# Patient Record
Sex: MALE | Race: WHITE
[De-identification: names, ages, dates, MRNs, and addresses within clinical notes are randomized per-mention and may not be internally consistent; named-entity substitution may affect disease eponyms.]

---

## 2017-01-17 ENCOUNTER — HOSPITAL ENCOUNTER (OUTPATIENT)
Dept: HOSPITAL 84 - D.CATH | Age: 82
LOS: 1 days | Discharge: HOME | End: 2017-01-18
Attending: INTERNAL MEDICINE
Payer: MEDICARE

## 2017-01-17 VITALS — SYSTOLIC BLOOD PRESSURE: 207 MMHG | DIASTOLIC BLOOD PRESSURE: 96 MMHG

## 2017-01-17 VITALS
WEIGHT: 278.58 LBS | BODY MASS INDEX: 35.75 KG/M2 | HEIGHT: 74 IN | WEIGHT: 278.58 LBS | HEIGHT: 74 IN | BODY MASS INDEX: 35.75 KG/M2 | BODY MASS INDEX: 35.75 KG/M2

## 2017-01-17 VITALS — SYSTOLIC BLOOD PRESSURE: 172 MMHG | DIASTOLIC BLOOD PRESSURE: 94 MMHG

## 2017-01-17 VITALS — SYSTOLIC BLOOD PRESSURE: 207 MMHG | DIASTOLIC BLOOD PRESSURE: 107 MMHG

## 2017-01-17 VITALS — SYSTOLIC BLOOD PRESSURE: 127 MMHG | DIASTOLIC BLOOD PRESSURE: 64 MMHG

## 2017-01-17 DIAGNOSIS — I50.22: ICD-10-CM

## 2017-01-17 DIAGNOSIS — I42.9: ICD-10-CM

## 2017-01-17 DIAGNOSIS — I25.119: Primary | ICD-10-CM

## 2017-01-17 DIAGNOSIS — Z95.5: ICD-10-CM

## 2017-01-17 LAB
ANION GAP SERPL CALC-SCNC: 10.2 MMOL/L (ref 8–16)
BASOPHILS NFR BLD AUTO: 0.6 % (ref 0–2)
BUN SERPL-MCNC: 10 MG/DL (ref 7–18)
CALCIUM SERPL-MCNC: 8.6 MG/DL (ref 8.5–10.1)
CHLORIDE SERPL-SCNC: 99 MMOL/L (ref 98–107)
CO2 SERPL-SCNC: 33.4 MMOL/L (ref 21–32)
CREAT SERPL-MCNC: 0.8 MG/DL (ref 0.6–1.3)
EOSINOPHIL NFR BLD: 3.7 % (ref 0–7)
ERYTHROCYTE [DISTWIDTH] IN BLOOD BY AUTOMATED COUNT: 14 % (ref 11.5–14.5)
GLUCOSE SERPL-MCNC: 106 MG/DL (ref 74–106)
HCT VFR BLD CALC: 36.8 % (ref 42–54)
HGB BLD-MCNC: 11.9 G/DL (ref 13.5–17.5)
IMM GRANULOCYTES NFR BLD: 0 % (ref 0–5)
LYMPHOCYTES NFR BLD AUTO: 29.8 % (ref 15–50)
MCH RBC QN AUTO: 33 PG (ref 26–34)
MCHC RBC AUTO-ENTMCNC: 32.3 G/DL (ref 31–37)
MCV RBC: 101.9 FL (ref 80–100)
MONOCYTES NFR BLD: 9.6 % (ref 2–11)
NEUTROPHILS NFR BLD AUTO: 56.3 % (ref 40–80)
OSMOLALITY SERPL CALC.SUM OF ELEC: 276 MOSM/KG (ref 275–300)
PLATELET # BLD: 136 10X3/UL (ref 130–400)
PMV BLD AUTO: 10.2 FL (ref 7.4–10.4)
POTASSIUM SERPL-SCNC: 3.6 MMOL/L (ref 3.5–5.1)
RBC # BLD AUTO: 3.61 10X6/UL (ref 4.2–6.1)
SODIUM SERPL-SCNC: 139 MMOL/L (ref 136–145)
WBC # BLD AUTO: 3.6 10X3/UL (ref 4.8–10.8)

## 2017-01-17 NOTE — NUR
DYAN REMAINS 211, PAGED AND SPOKE WITH SMILEY. T/O GIVEN FOR
CLONIDINE 0.2MG PO Q1HR PRN FOR SYSTOLIC BP GREATER THAN 180.

## 2017-01-17 NOTE — NUR
/98 AFTER 0.2 NG CLONIDINE GIVEN 1 HR BEFORE. RECHECK WITH MANUAL CUFF
/90. CAF RATE OF 48-64 WITH FREQUENT PVC'S DR REIS AWARE

## 2017-01-17 NOTE — NUR
SITTING UP IN BED, AAOX3, SKIN WARM AND DRY, RESP UNLABORED, IV PATENT TO LEFT
WRIST, RIGHT GROIN CATH SITE DRY AND INTACT, FAMILY AT BEDSIDE, NO DISTRESS
NOTED

## 2017-01-17 NOTE — NUR
CLONIDINE 0.2MGPO GIVEN FOR SYSTOLIC GREATER THAN 211 PER DR. REIS
ORDER. WILL MONITOR FOR EFFECTIVENESS.

## 2017-01-17 NOTE — HEMODYNAMI
PATIENT:ELMER SOTOMAYOR                                 MEDICAL RECORD: [id number]
: [birth year]                                            LOCATION:D.CAT          
ACCT# [id number]                                       ADMISSION DATE: 17
 
 
 Generatedon:201713:[id number]
Patient name: ELMER SOTOMAYOR Patient #: [id number] Visit #: [id number] SSN: YOB: 1934 Date of
study: 2017
Page: Of
Hemodynamic Procedure Report
****************************
Patient Data
Patient Demographics
Procedure consent was obtained
First Name: ELMER          Gender: Male
Last Name: СВЕТЛАНА          : 1934
Middle Initial: D           Age: 82 year(s)
Patient #: [id number]       Race: 
Visit #: [id number]
Accession #:
[id number]
Additional ID: [id number]
Contact details
Address: [address] [place] [address]  Phone: [phone]
State: [place]
City: [place]
Zip code: [address]
Past Medical History
History of disease
Date         Diagnosis          Comments
CAD
Allergies: No known allergies
Admission
Admission Data
Admission Date: 2017   Admission Time: 8:56
Admit Source: Other
Lab Results
Lab Result Date: 2017  Lab Result Time: 0:00
Biochemistry
Name         Units    Result                Min      Max
Creatinine   mg/dl    0.8      --(-*--)--   0.6      1.3
CBC
Name         Units    Result                Min      Max
Hemoglobin   g/dl     11.9     *-(----)--   13.5     17.5
Procedure
Procedure Types
Cath Procedure
Diagnostic Procedure
[facility] w/Coronaries
FFR/IVUS
Intra-Coronary IVUS Initial
PCI Procedure
Coronary Stent Initial
PTCA Additional
Procedure Description
Procedure Date
Procedure Date: 2017
 
Procedure Start Time: 13:27
Procedure End Time: 13:56
Procedure Staff
Name                            Function
Nba Mcclain MD                Performing Physician
Calvin Francois RT                  Scrub
Alanna Palma RN                  Nurse
Effie Simon RT             Monitor
Beto Mena RT                  Circulator
Procedure Data
Cath Procedure
Fluoroscopy
Diagnostic fluoroscopy      Total fluoroscopy Time: 8
time: 8 min                 min
Diagnostic fluoroscopy      Total fluoroscopy dose:
dose: 1355 mGy              1355 mGy
Contrast Material
Contrast Material Type                       Amount (ml)
Isovue 300                                   139
Entry Location
Entry     Primary  Successful  Side  Size  Upsize Upsize Entry    Closure Succes
sful  Closure
Location                             (Fr)  1 (Fr) 2 (Fr) Remarks  Device        
      Remarks
Femoral                        Right 5 Fr  6 Fr                   Exoseal
artery                                     Short
Estimated blood loss: 10 ml
Diagnostic catheters
Device Type               Used For           End Catheter
Placement
Cordis 5Fr Pigtail        LV Angiography
Catheter (MP)
Cordis 5Fr JL 4.0         Left Coronary
Catheter (MP)             Angiography
Cordis 5Fr 3DRC Catheter  Right Coronary
(MP)                      Angiography
Cordis Infinity 5Fr JL 6  Left Coronary
catheter                  Angiography
Procedure Complications
No complications
Procedure Medications
Medication           Administration Route Dosage
Oxygen               NC                   2 l/min
Benadryl             I.V.                 50 mg
Lidocaine 2%         added to field       20
Heparin Flush Bag    added to field       2 bags
(1000units/500ml NS)
0.9% NaCl            I.V.                 100 ml/hr
Versed               I.V.                 1 mg
Fentanyl             I.V.                 50 mcg
Versed               I.V.                 1 mg
Fentanyl             I.V.                 50 mcg
Heparin Bolus        I.V.                 4000 units
Versed               I.V.                 1 mg
Fentanyl             I.V.                 50 mcg
Integrilin (Bolus    I.V.                 11.3 ml
2mg/ml)
Dobutamine           I.V. drip            5 mcg/kg/min
(500mg/250ml D5W)
Versed               I.V.                 1 mg
 
Fentanyl             I.V.                 50 mcg
Plavix               P.O.                 600 mg
Hemodynamics
Rest
HGB: 11.9 (g/dl) Heart Rate: 60 (bpm)
Snapshots
Pre Cath      Intra         NCS           Post Cath
Vital Signs
Time     Heart  Resp   SPO2 etCO2   [id number] NIBP (mmHg)  Rhythm  Pain    Sedatio
n
Rate   (ipm)  (%)  (mmHg)  (mmHg)                       Status  Level
(bpm)
13:02:31 63     17     98   0       0       Measuring    A-Fib   0 (11)  10(A)
, No
pain
13:03:06 59     18     98   0       0       194/118(180) A-Fib   0 (11)  10(A)
, No
pain
13:07:36 62     16     97   0       0       196/106(151) A-Fib   0 (11)  10(A)
, No
pain
13:11:54 54     16     96   0       0       151/102(136) A-Fib   0 (11)  10(A)
, No
pain
13:16:14 58     18     95   0       0       172/100(139) A-Fib   0 (11)  10(A)
, No
pain
13:21:33 49     17     98   0       0       166/99(146)  A-Fib   0 (11)  10(A)
, No
pain
13:25:58 60     16     95   0       0       175/97(137)  A-Fib   0 (11)  10(A)
, No
pain
13:30:10 51     16     97   0       0       145/91(138)  A-Fib   0 (11)  9(A)
, No
pain
13:35:11 58     17     97   0       0       165/96(142)  A-Fib   0 (11)  9(A)
, No
pain
13:40:40 60     15     95   0       0       147/81(111)  A-Fib   0 (11)  9(A)
, No
pain
13:46:02 52     15     97   0       0       151/84(122)  A-Fib   0 (11)  9(A)
, No
pain
13:51:01 54     17     97   0       0       Measuring    A-Fib   0 (11)  9(A)
, No
pain
13:51:24 52     18     96   0       0       165/88(132)  A-Fib   0 (11)  9(A)
, No
pain
13:56:47 67     16     97   0       0       168/99(131)  A-Fib   0 (11)  10(A)
, No
pain
Medications
Time     Medication       Route  Dose       Verified Delivered Reason          N
otes    Effectiveness
by       by
13:01:52 Oxygen           NC     2 l/min    Nba Mondragon    used for
Johny Palma RN   procedure
 
13:02:01 Benadryl         I.V.   50 mg      Nba Mondragon    used for
Johny Palma RN   procedure
13:10:33 Lidocaine 2%     added  20ml vial  Nba Arango   for local
to                Johny Mcclain MD  anesthetic
field
13:10:38 Heparin Flush    added  2 bags     Nba Arango   used for
Bag              to                Johny Mcclain MD  procedure
(1000units/500ml field
NS)
13:10:48 0.9% NaCl        I.V.   100 ml/hr  Nba Mondragon    Per physician
Johny Palma RN
13:27:00 Versed           I.V.   1 mg       Nba Patelie    for sedation
Johny Palma RN
13:27:06 Fentanyl         I.V.   50 mcg     Nba Patelie    for sedation
Johny Palma RN
13:28:20 Versed           I.V.   1 mg       Nbabrooke Patelie    for sedation
Johny Palma RN
13:28:24 Fentanyl         I.V.   50 mcg     Nba  Buffie    for sedation
Johny Palma RN
13:36:15 Heparin Bolus    I.V.   4000 units Nba Mondragon    for             v
erified
Johny Palma RN   anticoagulation alvaro
tauth
13:37:33 Versed           I.V.   1 mg       Nba  Buffie    for sedation
Johny Palma RN
13:37:37 Fentanyl         I.V.   50 mcg     Nba Patelie    for sedation
Johny Palma RN
13:43:14 Integrilin       I.V.   11.3 ml    Nba Patelie    for
(Bolus 2mg/ml)                     Johny Palma RN   antiplatelet
therapy
13:47:28 Dobutamine       I.V.   5          Nba  Jorgeie    Per physician
(500mg/250ml     drip   mcg/kg/min Johny Palma RN
D5W)
13:49:04 Versed           I.V.   1 mg       Nba Patelie    for sedation
Johny Palma RN
13:49:08 Fentanyl         I.V.   50 mcg     Nba Patelie    for sedation
Johny Palma RN
13:57:36 Plavix           P.O.   600 mg     Nba Mondragon    for
Johny Palma RN   antiplatelet
therapy
Procedure Log
Time     Note
12:44:46 Diagnostic Cath Status : Elective
12:45:18 Admit Source: Other
12:45:23 Beto Mena RT(R) sent for patient. Start room use.
12:45:24 Time tracking: Regular hours
12:45:28 Plan of Care:Hemodynamics will remain stable., Cardiac
rhythm will remain stable., Comfort level will be
maintained., Respiratory function will remain
adequate., Patient/ family verbilizes understanding of
procedure., Procedure tolerated without complication.,
Recovers from procedure without complications..
12:51:33 Patient received from Outpatients to [facility] 1 Alert and
oriented. Tansferred to table in Supine position.
12:51:34 Warm blankets applied, and obed hugger turned on for
patient comfort.
12:51:34 Correct patient and procedure confirmed by team.
12:51:36 Signed procedure consent form obtained from patient.
12:51:37 ECG and BP/O2 sat monitors applied to patient.
13:00:42 Vital chart was started
 
13:01:52 Oxygen 2 l/min NC was given by Alanna Palma RN; used
for procedure;
13:02:01 Benadryl 50 mg I.V. was given by Alanna Palma RN; used
for procedure;
13:02:29 Rhythm: atrial fibrillation
13:02:31 Full Disclosure recording started
13:02:40 H&P Date Dictated: 2017 Within 30 days and on
chart., H&P Addendum completed by physician on day of
procedure. (MUST COMPLETE FOR ALL OUTPATIENTS).
13:02:41 Pre-procedure instructions explained to patient.
13:02:41 Pre-op teaching completed and patient verbalized
understanding.
13:02:43 Family in waiting room.
13:02:44 Patient NPO since Midnight.
13:02:52 Patient allergic to No known allergies
13:02:54 Is the patient allergic to Iodine/contrast media? No.
13:02:58 Is patient on blood thinner?Yes
13:03:54 **ACC** The patient was administered the following
blood thiners within the last 24 hours: None
13:04:08 Patient diabetic? No.
13:04:15 Previous problem with sedation/anesthesia? No ?
13:04:16 Snore? Yes
13:04:17 Sleep apnea? Yes
13:04:18 Deviated septum? No
13:04:19 Opens mouth fully? Yes
13:04:19 Sticks out tongue? Yes
13:04:21 Airway obstruction? No ?
13:04:24 Dentures? No ?
13:04:27 Pre procedure: right dorsailis pedis pulse 2+ Normal;
easily identifiable; not easily obliterated
13:04:30 Patient pain scale 0/10 ?.
13:04:33 IV patent on arrival in left hand with 0.9% NaCl at
[facility].
13:05:02 Lab Result : Creatinine 0.8 mg/dl
13:05:02 Lab Result : Hemoglobin 11.9 g/dl
13:05:05 Lab results completed and on chart.
13:05:07 Right groin area was prepped with chlora-prep and
draped in sterile fashion
13:05:08 Alarms reviewed by R. N.
13:05:08 Sharps counted by scrub and verified by R.N.
13:05:12 Use device set Femoral Dx
13:05:12 Acist Syringe opened to sterile field.
13:05:13 Bag Decanter opened to sterile field.
13:05:13 Cardinal Cath Pack opened to sterile field.
13:05:14 Terumo 5Fr Montgomery Sheath opened to sterile field.
13:05:14 St Jean[place]Paul 260cm J .035 wire opened to sterile field.
13:05:15 Acist Hand Control opened to sterile field.
13:05:16 Acist Manifold opened to sterile field.
13:05:16 Cordis Infinity 5Fr Multipack catheter opened to
sterile field.
13:05:17 Tegaderm 4 x 4 opened to sterile field.
13:09:34 **ACC** Patient presents with Unstable Angina CCS
Anginal Class 3--Marked limitation of physical
activity, angina occurs with ordinary activity..
13:10:06 **ACC**Patient has been prescribed/administered the
following anti-anginal medication within the last 2
weeks: None
13:10:33 Lidocaine 2% 20ml vial added to field was given by
Nba Mcclain MD; for local anesthetic;
13:10:38 Heparin Flush Bag (1000units/500ml NS) 2 bags added to
 
field was given by Nba Mcclain MD; used for
procedure;
13:10:48 0.9% NaCl 100 ml/hr I.V. was given by Alanna Palma RN;
Per physician;
13:12:15 Baseline sample Acquired.
13:14:53 Zero performed for pressure channel P1
13:26:04 Final Timeout: patient, procedure, and site verified
with staff and physician. All members of the team are
in agreement.
13:26:06 Right groin site verified by team.
13:26:12 Physical assessment completed. ASA score P 3 - A
patient with severe systemic disease as per Nba Mcclain MD.
13:26:15 Sedation plan: IV Moderate Sedation Versed, Fentanyl
13:27:00 Versed 1 mg I.V. was given by Alanna Palma RN; for
sedation;
13:27:06 Fentanyl 50 mcg I.V. was given by Alanna Palma RN; for
sedation;
13:27:24 Procedure started.
13:27:27 Local anesthetic to right femoral artery with
Lidocaine 2% by Nba Mcclain MD.**INITIAL ACCESS
ONLY**
13:27:59 A 5 Fr sheath was inserted into the Right Femoral
artery
13:28:18 A Cordis 5Fr Pigtail Catheter (MP) was advanced over
the wire and used for LV Angiography.
13:28:20 Versed 1 mg I.V. was given by Alanna Palma RN; for
sedation;
13:28:24 Fentanyl 50 mcg I.V. was given by Alanna Palma RN; for
sedation;
13:29:26 LV gram done using TAMAYO
13:29:33 Injector settings: Ml/sec: 10, Volume: 20,
13:29:54 EF : 40 %
13:29:57 Catheter removed.
13:30:21 A Cordis 5Fr JL 4.0 Catheter (MP) was advanced over
the wire and used for Left Coronary Angiography.
removed, unable to cannulate.
13:32:00 A Cordis 5Fr 3DRC Catheter (MP) was advanced over the
wire and used for Right Coronary Angiography.
13:32:47 Catheter removed.
13:32:53 A Cordis Infinity 5Fr JL 6 catheter was advanced over
the wire and used for Left Coronary Angiography.
13:33:13 Terumo 6Fr Montgomery Sheath opened to sterile field.
13:33:13 Merit BasixCompak Inflation Kit opened to sterile
field.
13:33:14 Castro Whisper J 300cm 0.014 guide wire opened to
sterile field.
13:34:16 Volcano Platinum Eagleye IVUS Catheter opened to
sterile field.
13:34:56 Medtronic Launcher 6Fr JL 5.0 guide catheter opened to
sterile field.
13:35:01 Catheter removed.
13:35:08 Sheath upsized to a 6 Fr Short.
13:35:15 **ACC** PCI Site: pLAD has ?% stenosis.
13:35:19 **ACC** Pre-intervention BRAXTON Flow is 3.
13:36:07 6 Fr JL 5.0 guide catheter was inserted over the wire
13:36:15 Heparin Bolus 4000 units I.V. was given by Alanna Palma
RN; for anticoagulation; verified alvaro tauth
13:37:05 Whisper wire advanced.
13:37:33 Versed 1 mg I.V. was given by Alanna Palma RN; for
 
sedation;
13:37:37 Fentanyl 50 mcg I.V. was given by Alanna Palma RN; for
sedation;
13:37:51 IVUS catheter advanced over wire.
13:37:55 IVUS pass to LAD lesion performed.
13:41:36 IVUS catheter removed over wire.
13:43:14 Integrilin (Bolus 2mg/ml) 11.3 ml I.V. was given by
Alanna Palma RN; for antiplatelet therapy;
13:43:53 Inflation Number: 1 A Medtronic Integrity 3.5 X 15
stent was prepped and advanced across the Prox LAD.
The stent was deployed at 23 AMBER for 0:10 (min:sec).
13:44:15 Inflation number: 2 The stent balloon was then
re-inflated across the Prox LAD to 21 AMBER for 0:11
(min:sec).
13:45:19 Stent catheter was removed intact over wire.
13:47:01 Inflation Number: 3 A Medtronic Integrity 4.0 X 9
stent was prepped and advanced across the Prox LAD.
The stent was deployed at 21 AMBER for 0:08 (min:sec).
13:47:28 Dobutamine (500mg/250ml D5W) 5 mcg/kg/min I.V. drip
was given by Alanna Palma RN; Per physician;
13:47:43 Stent catheter was removed intact over wire.
13:48:52 Wire redirected to Diag.
13:49:04 Versed 1 mg I.V. was given by Alanna Palma RN; for
sedation;
13:49:08 Fentanyl 50 mcg I.V. was given by Alanna Palma RN; for
sedation;
13:49:22 Inflation number: 1 A Euphora 3.0 x 6 Balloon was
prepped and advanced across the 1st Diag, then
inflated to 9 AMBER for 0:10 (min:sec).
13:49:42 Balloon removed over the wire.
13:50:12 Wire removed.
13:50:12 Guide catheter removed.
13:50:23 Sheath removed intact; hemostasis achieved with
Exoseal to the Right Femoral artery.
13:50:35 Cordis 6Fr Exoseal opened to sterile field.
13:50:39 Procedure ended.(Physican Out)
13:51:27 Fluoroscopy time 08.00 minutes.
13:51:30 Flurop Dose total: 1355
13:51:30 Fluoroscopy dose: 1355 mGy
13:51:33 Contrast amount:Isovue 300 139ml.
13:51:34 Sharps counted by scrub and verified by R.N.
13:51:36 Insertion/operative site no bleeding no hematoma.
13:51:41 Post-op/insertion site Right Femoral artery dressed
using a 4 x 4 and Tegaderm.
13:51:45 Post right femoral artery:stable, clean and dry
13:51:47 Post Procedure Pulses reassessed and unchanged
13:51:52 Post-procedure physical assessment completed. ASA
score P 3 - A patient with severe systemic disease as
per Nba Mcclain MD.
13:51:54 Post procedure rhythm: unchanged.
13:51:58 Estimated blood loss: 10 ml
13:52:00 Post procedure instruction explained to
patient.Patient verbalizes understanding.
13:52:00 Patient needs reinforcement of post procedure
teaching.
13:52:34 Procedure type changed to Cath procedure, Diagnostic
procedure, LHC, LHC w/Coronaries, FFR/IVUS,
Intra-Coronary IVUS Initial, PCI procedure, Coronary
Stent Initial, PTCA Additional
13:52:42 Procedure Complication : No complications
 
13:52:44 See physician's report for complete and final results.
13:53:37 Procedure and supply charges have been captured,
reviewed, submitted and are correct.
13:55:34 Vital chart was stopped
13:55:38 Report given to Post Procedure Room.
13:55:42 Patient transfered to Post Procedure Room with
Stretcher.
13:56:01 Procedure ended.
13:56:01 Full Disclosure recording stopped
13:56:04 End room use (Document Last)
13:57:36 Plavix 600 mg P.O. was given by Alanna Palma RN; for
antiplatelet therapy;
Intervention Summary
Intervention Notes
Time     ActionType  Lesion and  Equipment Action#  Pressure  Duration
Attributes  Used
13:43:53 Place stent Prox LAD    Medtronic 1        23        00:10
Integrity
3.5 X 15
stent
13:44:15 Reinflate   Prox LAD    Medtronic 2        21        00:11
stent                   Integrity
balloon                 3.5 X 15
stent
13:47:01 Place stent Prox LAD    Medtronic 3        21        00:08
Integrity
4.0 X 9
stent
13:49:22 Inflate     1st Diag    Euphora   1        9         00:10
balloon                 3.0 x 6
Balloon
Device Usage
Item Name   Manufacture  Quantity  Catalog     Hospital Part    Current Minimal 
Lot# /
Number      Charge   Number  Stock   Stock   Serial#
Code
[facility]       Ac[facility]        1         [id number]       [id number]   698169  439577  20
Syringe     Medical
Systems Inc
Bag         Microtek     1         S       900675   96000   129464  5
Dec[employer]    [employer]
Cardinal    Cardinal     1         [id number]  136510   14245   619920  5
Cath Digital Dandelion
Terumo 5Fr  Terumo       1         [id number]      307520   227342  724612  40
Montgomery
Sheath
[place]     [employer] Jean[place]Paul      1         [id number]      247923   743078  537417  30
260cm J
.035 wire
Acist Hand  Acist        1         15355       633630   702257  374976  5
Control     Medical
Systems Inc
Acist       Acist        1         08988       324628   517462  745003  5
Manifold    Medical
Systems [employer]
Cordis      Cardinal     1         [id number]      827304   98087   220790  30
Infinity    Health
5Fr
Multipack
catheter
 
Tegaderm 4  3M           1         1626W       830478   274474  073671  5
x 4
Cordis 5Fr  Cardinal     1                                      261566  5
Pigtail     Health
Catheter
(MP)
Cordis 5Fr  Cardinal     1                                      951107  5
JL 4.0      Health
Catheter
(MP)
Cordis 5Fr  Cardinal     1                                      504979  5
3DRC        Health
Catheter
(MP)
Cordis      Cardinal     1         [id number]     204051   036629  215256  5
Infinity    Health
5Fr JL 6
catheter
Terumo 6Fr  Terumo       1         [id number]      318970   853328  170965  40
Montgomery
Sheath
Merit       Merit        1         [id number]      255625   918499  365243  15
Info [employer] Medical
Inflation
Kit
Castro      Castro       1         [id number]   905685   483783  623890  5
Whisper J   Vascular
300cm 0.014
guide wire
Volcano     [place]      1         04355L      938859   820719  441753  8
[place]
Eagleye
IVUS
Catheter
Medtronic   Medtronic    1         [id number]     257715   95614   306004  1
Launcher
6Fr JL 5.0
guide
catheter
Medtronic   Medtronic    1         [id number]   657487   354796  875115  1       
[id number]
Integrity
3.5 X 15
stent
Medtronic   Medtronic    1         [id number]   335580   228753  4880177 1       
[id number]
Integrity
4.0 X 9
stent
Euphora 3.0 Medtronic    1         [id number]    476000   272772  856313  5       
[id number]
x 6 Balloon
Cordis 6Fr  Cardinal     1                945241   029708  303248  10
[facility]     Health
Signature Audit [place]
Stage           Time        Signature      Unsigned
Intra-Procedure 2017   Effie
1:59:25 PM  Counts RT(R)
Signatures
Monitor : Effie     Signature :
 
Counts RT               ______________________________
Date : ______________ Time :
______________
 
 
 
 
 
 
 
 
 
 
 
 
 
 
 
 
 
 
 
 
 
 
 
 
 
 
 
 
 
 
 
 
 
 
 
 
 
 
 
 
 
 
 
 
 
 
 
 
 
 
 
 
[place] [address] [address] [place] [address]

## 2017-01-17 NOTE — NUR
/113 WITH HR CAF AT 63 NS INFUSING AS ORDERED  WITH DOBUTAMINE
INFUSING AT 5 ARACELIS/KG/MIN OR 18.2 CC/HR. CHEST PAIN IS DENIED. 6 FR
EXOSEAL R/GROIN CDI NO BLEEDING NO HEMATOMA NOTED. DR REIS NOTIFIED
WITH PRESSURE NEW ORDERS RECIEVED AND NOTED

## 2017-01-17 NOTE — HEMODYNAMI
PATIENT:ELMER SOTOMAYOR                                 MEDICAL RECORD: [id number]
: [birth year]                                            [id number]                                       ADMISSION DATE: 17
 
 
 Generatedon:201713:00
Patient name: ELMER SOTOMAYOR Patient #: [id number] Visit #: [id number] SSN: YOB: 1934 Date of
study: 2017
Page: Of
Hemodynamic Procedure Report
****************************
Patient Data
Patient Demographics
Procedure consent was obtained
First Name: ELMER          Gender: Male
Last Name: СВЕТЛАНА          : 1934
Middle Initial: D           Age: 82 year(s)
Patient #: [id number]       Race: 
Visit #: [id number]
Accession #:
[id number]
Additional ID: [id number]
Contact details
Address: [address] [place] [address]  Phone: [phone]
State: [place]
City: [place]
Zip code: [address]
Past Medical History
History of disease
Date         Diagnosis          Comments
CAD
Allergies: No known allergies
Admission
Admission Data
Admission Date: 2017   Admission Time: 8:56
Admit Source: Other         Insurance Payor: Medicare,
Room #: D.[facility]              [facility]
Height (in.): 74            BSA: 2.5 (m2)
Height (cm.): 187.96        BMI: 35.56 (kg/m2)
Weight (lbs.): 277
Weight (kg.): 125.65
Lab Results
Lab Result Date: 2017  Lab Result Time: 0:00
Biochemistry
Name         Units    Result                Min      Max
Creatinine   mg/dl    0.8      --(-*--)--   0.6      1.3
CBC
Name         Units    Result                Min      Max
Hemoglobin   g/dl     11.9     *-(----)--   13.5     17.5
Procedure
Procedure Types
Cath Procedure
PCI Procedure
Coronary Stent Initial
Procedure Description
Procedure Date
Procedure Date: 2017
Procedure Start Time: 12:39
 
Procedure End Time: 13:00
Procedure Staff
Name                            Function
Nba Mcclain MD                Performing Physician
Effie Simon RT             Scrub
Alanna Palma RN                  Nurse
Regino Barrientos RN                Circulator
Calvin Francois RT                  Monitor
Procedure Data
Cath Procedure
Fluoroscopy
Diagnostic fluoroscopy      Total fluoroscopy Time: 4.7
time: 4.7 min               min
Diagnostic fluoroscopy      Total fluoroscopy dose: 648
dose: 648 mGy               mGy
Contrast Material
Contrast Material Type                       Amount (ml)
Isovue 300                                   104
Entry Location
Entry     Primary  Successful  Side  Size  Upsize Upsize Entry    Closure Succes
sful  Closure
Location                             (Fr)  1 (Fr) 2 (Fr) Remarks  Device        
      Remarks
Femoral                        Left  6 Fr                         Vascade
artery                               Short                        Closure
System
Estimated blood loss: 10 ml
Procedure Complications
No complications
Procedure Medications
Medication           Administration Route Dosage
Oxygen               NC                   2 l/min
Lidocaine 2%         added to field       20
Heparin Flush Bag    added to field       2 bags
(1000units/500ml NS)
0.9% NaCl            I.V.                 25 ml/hr
Dobutamine           I.V. drip            5 mcg/kg/min
(500mg/250ml D5W)
Bumex                I.V.
Versed               I.V.                 1 mg
Fentanyl             I.V.                 50 mcg
Versed               I.V.                 1 mg
Fentanyl             I.V.                 50 mcg
Heparin Bolus        I.V.                 4000 units
Fentanyl             I.V.                 50 mcg
Nitroglycerin IC/IA  I.C.                 200 mcg
Nitroglycerin IC/IA  I.C.                 200 mcg
Hemodynamics
Rest
BSA: 2.5 (m2) O2 Consumption: Estimated: 274.73 (ml/min) O2 Consumption indexed:
 Estimated:109.89
(ml/min/m) Heart Rate: 59 (bpm)
Snapshots
Pre Cath      Intra         NCS           Post Cath
Vital Signs
Time     Heart  Resp   SPO2 etCO2   KP3alis NIBP (mmHg) Rhythm  Pain    Sedation
Rate   (ipm)  (%)  (mmHg)  (mmHg)                      Status  Level
(bpm)
12:31:57 59     17     98   0       0       Measuring   A-Fib   0 (11)  10(A)
, No
 
pain
12:32:05 56     16     99   0       0       166/80(133) A-Fib   0 (11)  10(A)
, No
pain
12:36:27 60     15     97   0       0       161/81(118) A-Fib   0 (11)  10(A)
, No
pain
12:40:47 59     16     100  0       0       156/82(125) A-Fib   0 (11)  9(A)
, No
pain
12:46:27 64     16     99   0       0       126/51(98)  A-Fib   0 (11)  9(A)
, No
pain
12:50:48 45     15     99   0       0       90/36(68)   A-Fib   0 (11)  9(A)
, No
pain
12:54:49 60     16     99   0       0       89/57(72)   A-Fib   0 (11)  10(A)
, No
pain
12:58:51 60     13     97   0       0       108/57(78)  A-Fib   0 (11)  10(A)
, No
pain
Medications
Time     Medication       Route  Dose       Verified Delivered Reason          N
otes     Effectiveness
by       by
12:26:59 Oxygen           NC     2 l/min    Nba Mondragon    used for
Johny Palma RN   procedure
12:27:06 Lidocaine 2%     added  20ml vial  Nba Arango   for local
to                Johny Mcclain MD  anesthetic
field
12:27:13 Heparin Flush    added  2 bags     Nba Arango   used for
Bag              to                Johny Mcclain MD  procedure
(1000units/500ml field
NS)
12:27:25 Dobutamine       I.V.   5          Nba Mondragon    Per physician   c
ontinued
(500mg/250ml     drip   mcg/kg/min Johny Palma RN                   from
D5W)                                                                  floor
12:27:25 0.9% NaCl        I.V.   25 ml/hr   Nba Mondragon    Per physician
Johny Palma RN
12:28:00 Bumex            I.V.   drip       Nba Mondragon    Per physician
stopped    Johny Palma RN
for
procedure
12:37:09 Versed           I.V.   1 mg       Nba Mondragon    for sedation
Johny Palma RN
12:37:16 Fentanyl         I.V.   50 mcg     Nba Mondragon    for sedation
Johny Palma RN
12:40:40 Versed           I.V.   1 mg       Nba Mondragon    for sedation
Johny Palma RN
12:40:44 Fentanyl         I.V.   50 mcg     Nba Mondragon    for sedation
Johny Palma RN
12:41:16 Heparin Bolus    I.V.   4000 units Nba Mondragon    for             v
erified
Johny Palma RN   anticoagulation with dr mcclain
12:43:26 Fentanyl         I.V.   50 mcg     Nba Mondragon    for sedation
Johny Palma RN
12:48:50 Nitroglycerin    I.C.   200mcg     Nba Arango   for
 
IC/IA                              Johny Mcclain MD  vasodilation
12:50:01 Nitroglycerin    I.C.   200mcg     Nba Arango   for
IC/IA                              Johny Mcclain MD  vasodilation
Procedure Log
Time     Note
12:01:52 Admit Source: Other
12:02:21 Regino Barrientos RN sent for patient. Start room use.
12:02:22 Time tracking: Regular hours
12:02:26 Plan of Care:Hemodynamics will remain stable., Cardiac
rhythm will remain stable., Comfort level will be
maintained., Respiratory function will remain
adequate., Patient/ family verbilizes understanding of
procedure., Procedure tolerated without complication.,
Recovers from procedure without complications..
12:05:27 Use device set Femoral PCI
12:05:28 Acist Syringe opened to sterile field.
12:05:28 Acist Hand Control opened to sterile field.
12:05:29 Bag Decanter opened to sterile field.
12:05:29 Cardinal Cath Pack opened to sterile field.
12:05:30 Terumo 6Fr [place] Sheath opened to sterile field.
12:05:31 St Jean-Paul 260cm J .035 wire opened to sterile field.
12:05:33 Merit BasixCompak Inflation Kit opened to sterile
field.
12:05:34 Acist Manifold opened to sterile field.
12:05:35 Tegaderm 4 x 4 opened to sterile field.
12:05:43 Castro Whisper J 300cm 0.014 guide wire opened to
sterile field.
12:10:59 Insurance Payor : Medicare, [facility]
12:11:37 Patient received from PCU to CCL 2 Alert and oriented.
Tansferred to table in Supine position.
12:11:38 Warm blankets applied, and obed hugger turned on for
patient comfort.
12:11:39 Correct patient and procedure confirmed by team.
12:11:40 Signed procedure consent form obtained from patient.
12:11:40 ECG and BP/O2 sat monitors applied to patient.
12:11:41 Full Disclosure recording started
12:15:58 H&P Date Dictated: 2017 Within 30 days and on
chart..
12:15:59 Pre-procedure instructions explained to patient.
12:16:00 Pre-op teaching completed and patient verbalized
understanding.
12:16:01 Family in waiting room.
12:16:03 Patient NPO since Midnight.
12:16:08 Patient allergic to No known allergies
12:16:10 Is the patient allergic to Iodine/contrast media? No.
12:16:11 Is patient on blood thinner?Yes
12:16:13 **ACC** The patient was administered the following
blood thiners within the last 24 hours: **ACC**Plavix
12:16:15 Patient diabetic? No.
12:16:18 Previous problem with sedation/anesthesia? No ?
12:16:20 Snore? Yes
12:16:20 Sleep apnea? Yes
12:16:21 Deviated septum? No
12:16:22 Opens mouth fully? Yes
12:16:22 Sticks out tongue? Yes
12:16:28 Airway obstruction? No ?
12:16:32 Dentures? No ?
12:17:21 Pre procedure: left dorsailis pedis pulse 1+ Palpable,
but thready & weak; easily obliterated
12:17:34 Patient pain scale 0/10 ?.
 
12:18:03 IV patent on arrival in left hand, left forearm with
0.9% NaCl at [facility].
12:26:31 Lab results completed and on chart.
12:26:48 Left groin area was prepped with chlora-prep and
draped in sterile fashion
12:26:53 Alarms reviewed by R. N.
12:26:54 Alarms reviewed by R. N.
12:26:54 Sharps counted by scrub and verified by R.N.
12:26:57 Use device set Femoral PCI
12:26:58 Tegaderm 4 x 4 opened to sterile field.
12:26:59 Oxygen 2 l/min NC was given by lAanna Palma RN; used
for procedure;
12:27:00 Acist Manifold opened to sterile field.
12:27:02 IV Extension Set opened to sterile field.
12:27:02 Acist Syringe opened to sterile field.
12:27:03 Acist Hand Control opened to sterile field.
12:27:03 Bag Decanter opened to sterile field.
12:27:04 Cardinal Cath Pack opened to sterile field.
12:27:05 Terumo 6Fr Lake[place] Sheath opened to sterile field.
12:27:05 St Jean-Paul 260cm J .035 wire opened to sterile field.
12:27:06 Lidocaine 2% 20ml vial added to field was given by
Nba Mcclain MD; for local anesthetic;
12:27:06 Merit BasixCompak Inflation Kit opened to sterile
field.
12:27:13 Heparin Flush Bag (1000units/500ml NS) 2 bags added to
field was given by Nba Mcclain MD; used for
procedure;
12:27:25 Dobutamine (500mg/250ml D5W) 5 mcg/kg/min I.V. drip
was given by Alanna Palma RN; Per physician; continued
from floor
12::25 0.9% NaCl 25 ml/hr I.V. was given by Alanna Palma RN;
Per physician;
12:28:00 Bumex drip stopped for procedure I.V. was given by
Alanna Palma RN; Per physician;
12::35 Patient Height : 74 cm
12::44 Patient Weight : 277 kg
12:30:02 **ACC** Patient presents with Stable Angina CCS
Anginal Class 3--Marked limitation of physical
activity, angina occurs with ordinary activity..
12:30:07 Vital chart was started
12:30:11 Baseline sample Acquired.
12:30:48 Zero performed for pressure channel P1
12::07 Rhythm: atrial fibrillation
12:31:15 Physician paged
12:36:15 --------ALL STOP TIME OUT------
12:36:16 Final Timeout: patient, procedure, and site verified
with staff and physician. All members of the team are
in agreement.
12:36:17 Left groin site verified by team.
12:36:20 Physical assessment completed. ASA score P 2 - A
patient with mild systemic disease as per Nba Mcclain MD.
12:36:23 Sedation plan: IV Moderate Sedation Versed, Fentanyl
12:37:09 Versed 1 mg I.V. was given by Alanna Palma RN; for
sedation;
12:37:16 Fentanyl 50 mcg I.V. was given by Alanna Palma RN; for
sedation;
12:39:45 Procedure started.
12:39:49 Local anesthetic to left femerol artery with Lidocaine
2% by Nba Mcclain MD.**INITIAL ACCESS ONLY**
 
12:39:57 A 6 Fr Short sheath was inserted into the Left Femoral
artery
12:40:15 Medtronic Launcher 6Fr HS II guide catheter opened to
sterile field.
12:40:28 6 Fr HS II guide catheter was inserted over the wire
12:40:31 whisper wire advanced.
12:40:40 Versed 1 mg I.V. was given by Alanna Palma RN; for
sedation;
12:40:44 Fentanyl 50 mcg I.V. was given by Alanna Palma RN; for
sedation;
12:41:16 Heparin Bolus 4000 units I.V. was given by Alanna Palma
RN; for anticoagulation; verified with dr mcclain
12:42:30 Wire advanced across lesion.
12:43:26 Fentanyl 50 mcg I.V. was given by Alanna Palma RN; for
sedation;
12:45:25 Inflation Number: 1 A Medtronic Integrity 2.25 X 18
stent was prepped and advanced across the Dist RCA.
The stent was deployed at 13 AMBER for 0:10 (min:sec).
12:45:44 Inflation number: 1 The stent balloon was then
re-inflated across the Prox RCA to 17 AMBER for 0:10
(min:sec).
12:48:00 Stent catheter was removed intact over wire.
12:48:33 Inflation Number: 2 A Medtronic Integrity 4.0 X 22
stent was prepped and advanced across the Prox RCA.
The stent was deployed at 17 AMBER for 0:10 (min:sec).
12:48:50 Nitroglycerin IC/IA 200mcg I.C. was given by Nba Mcclain MD; for vasodilation;
12:49:03 Inflation number: 3 The stent balloon was then
re-inflated across the Prox RCA to 5 AMBER for 0:10
(min:sec).
12:50:01 Nitroglycerin IC/IA 200mcg I.C. was given by Nba Mcclain MD; for vasodilation;
12:51:35 Stent catheter was removed intact over wire.
12:51:36 Wire removed.
12:51:36 Guide catheter removed.
12:51:42 Vascade 6/7 Fr Closure Device opened to sterile field.
12:54:54 Sheath removed intact; hemostasis achieved with
Vascade Closure System to the Left Femoral artery.
12:54:56 Procedure ended.(Physican Out)
12:55:54 Fluoroscopy time 04.70 minutes.
12:55:58 Flurop Dose total: 648
12:55:58 Fluoroscopy dose: 648 mGy
12:56:02 Contrast amount:Isovue 300 104ml.
12:56:04 Sharps counted by scrub and verified by R.N.
12:56:06 Insertion/operative site no bleeding no hematoma.
12:56:08 Post-op/insertion site Left Femoral artery dressed
using a 4 x 4 and Tegaderm.
12:56:13 Post left femerol artery:stable, soft, clean and dry
12:56:14 Post Procedure Pulses reassessed and unchanged
12:56:16 Post-procedure physical assessment completed. ASA
score P 2 - A patient with mild systemic disease as
per Nba Mcclain MD.
12:56:19 Post procedure rhythm: unchanged.
12:56:24 Estimated blood loss: 10 ml
12:56:25 Post procedure instruction explained to
patient.Patient verbalizes understanding.
12:56:27 Patient needs reinforcement of post procedure
teaching.
12:59:03 Procedure type changed to Cath procedure, PCI
procedure, Coronary Stent Initial
 
12:59:59 Procedure and supply charges have been captured,
reviewed, submitted and are correct.
13:00:01 Procedure Complication : No complications
13:00:02 Vital chart was stopped
13:00:03 See physician's report for complete and final results.
13:00:04 Report given to PCU.
13:00:06 Patient transfered to PCU with Stretcher.
13:00:08 Procedure ended.
13:00:08 Full Disclosure recording stopped
13:00:13 **ACC-PCI Only** Patient was given prescriptions, or
instructed by Nba Mcclain MD to start/continue the
following medications upon discharge: Plavix
13:00:14 End room use (Document Last)
Intervention Summary
Intervention Notes
Time     ActionType  Lesion and  Equipment Action#  Pressure  Duration
Attributes  Used
12:45:25 Place stent Dist RCA    Medtronic 1        13        00:10
Integrity
2.25 X 18
stent
12:45:44 Reinflate   Prox RCA    Medtronic 1        17        00:10
stent                   Integrity
balloon                 2.25 X 18
stent
12:48:33 Place stent Prox RCA    Medtronic 2        17        00:10
Integrity
4.0 X 22
stent
12:49:03 Reinflate   Prox RCA    Medtronic 3        5         00:10
stent                   Integrity
balloon                 4.0 X 22
stent
Device Usage
Item Name   Manufacture  Quantity  Catalog       Hospital Part    Current Minima
l Lot# /
Number        Charge   Number  Stock   Stock   Serial#
Code
Acist       Acist        2         60938         070941   046799  293350  20
Syringe     Medical
Systems Inc
Acist Hand  Acist        2         38612         807966   150259  626399  5
Control     Medical
Systems Inc
Bag         Microtek     2         2002S         875443   77476   356764  5
Atmospheir Inc.
Cardinal    Cardinal     2         [address]    758371   03969   742313  5
Cath Pack   Health
Terumo 6Fr  Terumo       2         APS681        610130   972135  520683  40
[place]
Sheath
St Jean-Paul     St Jean-Paul      2         302379        245736   582593  710295  30
260cm J
.035 wire
Merit       Merit        2         [id number]        724200   774451  674311  15
Functional Neuromodulation Medical
Inflation
Kit
Acist       Acist        2         39734         896793   057368  138249  5
[employer]    Medical
 
Systems Inc
Tegaderm 4  3M           2         1626W         771639   977120  450257  5
x 4
Castro      Castro       1         [id number]     858045   763232  750264  5
Whisper J   Vascular
300cm 0.014
guide wire
IV          Hospira      1         58628-78      9706909 49454   674189  5
Extension
Set
Medtronic   Medtronic    1         [id number]       618181   71658   674879  1
Launcher
6Fr HS II
guide
catheter
Medtronic   Medtronic    1         [id number]     017771   722369  014911  1     
  [id number]
Integrity
2.25 X 18
stent
Medtronic   Medtronic    1         [id number]     871785   861840  473727  1     
  [id number]
Integrity
4.0 X 22
stent
Vascade 6/7 Cardiva      1         [id number]  371404   338500  553285  5
Fr Closure  Medical,
Device      Inc.
Signature Audit [place]on
Stage           Time        Signature      Unsigned
Intra-Procedure 2017   Calvin Francois
1:00:47 PM  RT(R)
Signatures
Monitor : Calvin Francois RT Signature :
______________________________
Date : ______________ Time :
______________
 
 
 
 
 
 
 
 
 
 
 
 
 
 
 
 
 
 
[address] [place] [address]

## 2017-01-18 VITALS — DIASTOLIC BLOOD PRESSURE: 61 MMHG | SYSTOLIC BLOOD PRESSURE: 126 MMHG

## 2017-01-18 VITALS — DIASTOLIC BLOOD PRESSURE: 53 MMHG | SYSTOLIC BLOOD PRESSURE: 102 MMHG

## 2017-01-18 VITALS — SYSTOLIC BLOOD PRESSURE: 151 MMHG | DIASTOLIC BLOOD PRESSURE: 80 MMHG

## 2017-01-18 VITALS — DIASTOLIC BLOOD PRESSURE: 68 MMHG | SYSTOLIC BLOOD PRESSURE: 124 MMHG

## 2017-01-18 LAB
ANION GAP SERPL CALC-SCNC: 9.7 MMOL/L (ref 8–16)
BUN SERPL-MCNC: 10 MG/DL (ref 7–18)
CALCIUM SERPL-MCNC: 8.9 MG/DL (ref 8.5–10.1)
CHLORIDE SERPL-SCNC: 95 MMOL/L (ref 98–107)
CO2 SERPL-SCNC: 37.4 MMOL/L (ref 21–32)
CREAT SERPL-MCNC: 0.9 MG/DL (ref 0.6–1.3)
GLUCOSE SERPL-MCNC: 110 MG/DL (ref 74–106)
OSMOLALITY SERPL CALC.SUM OF ELEC: 277 MOSM/KG (ref 275–300)
POTASSIUM SERPL-SCNC: 3.1 MMOL/L (ref 3.5–5.1)
SODIUM SERPL-SCNC: 139 MMOL/L (ref 136–145)

## 2017-01-18 NOTE — NUR
COMPLAINING OF LOWER ABDOMEN, BLADDER SCAN SHOWING 999+ CC OF URINE IN
BLADDER, SANTANA PLACED USING A 16FR, BIMAL WELL, IMMEDIATE RETURN OF 800CC OF
YELLOW URINE

## 2017-01-18 NOTE — NUR
LEFT GROIN STABLE.  IV TELEMETRY AND SANTANA DCD.  DC PLANS GIVEN.
UNDERSTANDING VOICED.  ESCORTED TO CAR BY W/C.

## 2017-01-20 NOTE — DS
PATIENT:ELMER SOTOMAYOR                  :[birth year]   MEDICAL RECORD: [id number]
 
                              DISCHARGE SUMMARY
                                                         
ADMISSION DATE:    17                       DISCHARGE DATE:     17
 
 
DATE OF DISCHARGE:  2017
 
DIAGNOSES:
1.  Angina.
2.  Coronary artery disease.
3.  Cardiomyopathy.
4.  Congestive heart failure, chronic systolic dysfunction.
5.  Percutaneous transluminal coronary angioplasty stent of the left anterior
descending and right coronary artery this admission.
 
HOSPITAL COURSE:  This is a gentleman who presents with angina and heart failure
symptomatology, found to have a cardiomyopathy, ejection fraction of 30%,
2-vessel disease to the LAD and RCA, underwent successful PTCA stent of the RCA
and LAD.  He was discharged home with the addition of aspirin and Plavix to his
medical regimen.  We will follow up with [facility] in 1 month.
 
TRANSINT:[id number] Voice Confirmation ID: [id number] DOCUMENT ID: [id number]
                                           
                                           RUDDY REIS MD             
 
 
 
Electronically Signed by RUDDY REIS on 17 at 1640
 
 
 
 
 
 
 
 
 
 
 
 
 
 
 
 
 
 
 
 
CC:                                                             [id number]
DICTATION DATE: 17 1257     :     17 1314      DEP CLI 
                                                                      17
[address] [place] [address]

## 2017-01-20 NOTE — OP
PATIENT NAME:  ELMER SOTOMAYOR                          MEDICAL RECORD: [id number]
:[birth year]                                             LOCATION:D.CAT          
                                                         ADMISSION DATE:        
SURGEON:  RUDDY REIS MD             
 
 
DATE OF OPERATION:  2017
 
PROCEDURES:
1.  Percutaneous transluminal coronary angioplasty stent right coronary artery.
2.  Selective coronary angiography.
 
INDICATIONS:  Angina, coronary artery disease, heart failure and cardiomyopathy.
 
PROCEDURE IN DETAIL:  After informed consent was obtained and detailed
explanation of risks, benefits as well as alternative therapies, the patient
elected to proceed with angiogram and angioplasty.  The left femoral area was
prepped and draped in the normal sterile fashion.  The right femoral artery was
cannulated via modified Seldinger technique with placement of 6-Divehi sheath. 
All catheters exchanged through this sheath.
 
FINDINGS:  The right coronary has a 75% stenosis in the mid vessel.  This was
addressed with a 2.25 x 18 mm Integrity stent.  There was however a guide
dissection at the ostium.  This was addressed with a 4.0 x 22 Integrity.  Result
was 0% residual throughout.  No further evidence of dissection or thrombus with
restoration of BRAXTON-3 flow.
 
IMPRESSION:  Successful percutaneous transluminal coronary angioplasty stent of
the right coronary artery going from 75% initial stenosis to 0% residual.
 
TRANSINT:[id number] Voice Confirmation ID: [id number] DOCUMENT ID: [id number]
                                           
                                           RUDDY REIS MD             
 
 
 
Electronically Signed by RUDDY REIS on 17 at 1640
 
 
 
 
 
 
 
 
 
 
 
 
 
CC:                                                             [id number]
DICTATION DATE: 17 1255     :     17 1311      DEP CLI 
                                                                      17
[place] [address]

## 2017-01-20 NOTE — OP
PATIENT NAME:  ELMER SOTOMAYOR                          MEDICAL RECORD: [id number]
:[birth year]                                             LOCATION:D.CAT          
                                                         ADMISSION DATE:        
SURGEON:  RUDDY REIS MD             
 
 
DATE OF OPERATION:  2017
 
PROCEDURES:
1.  PTCA stent, LAD.
2.  Intravascular ultrasound of the LAD.
3.  Left heart catheterization.
4.  Selective coronary angiography.
5.  Left ventriculogram.
 
INDICATION:  Angina, coronary artery disease, cardiomyopathy, heart failure and
shortness of breath.
 
PROCEDURE IN DETAIL:  After informed consent was obtained, after detailed
explanation of risks, benefits as well as alternative therapies, the patient
elected to proceed with angiogram and angioplasty.  The right femoral area was
prepped and draped in the normal sterile fashion.  The right femoral artery was
cannulated via modified Seldinger technique with placement of 6-Icelandic sheath. 
All catheters exchanged through this sheath.
 
FINDINGS:  The left ventriculogram was performed in standard 30-degree TAMAYO view
reveals global hypokinesis throughout all segments.  Overall ejection fraction
is 30%.
 
SELECTIVE CORONARY ANGIOGRAPHY:
1.  Left main is with no significant angiographic disease.
2.  Left anterior descending has a previously placed stent.  Proximal to the
previously placed stent, there was 70% stenosis confirmed by intravascular
ultrasound.
3.  Left circumflex has mild-to-moderate irregularities, but no flow-limiting
stenosis.
4.  Right coronary artery has a 70+ percent stenosis in the mid vessel.
 
PERCUTANEOUS TRANSLUMINAL CORONARY ANGIOPLASTY STENT OF THE LEFT ANTERIOR
DESCENDING:  The stent used was a 3.5 x 15 and 4.0 x 8, both Integrity stents. 
Result was 0% residual stenosis.
 
OVERALL IMPRESSION:  Successful percutaneous transluminal coronary angioplasty
stent of the left anterior descending going from 70% initial stenosis confirmed
by intravascular ultrasound to 0% residual.
 
PLAN:  Percutaneous transluminal coronary angioplasty stent of the right
coronary artery in the near future.
 
TRANSINT:[id number] Voice Confirmation ID: [id number] DOCUMENT ID: [id number]
 
 
 
OPERATIVE REPORT                               [id number]    ELMER SOTOMAYOR JEFFREY MD             
 
 
 
Electronically Signed by RUDDY REIS on 17 at 1640
 
 
 
 
 
 
 
 
 
 
 
 
 
 
 
 
 
 
 
 
 
 
 
 
 
 
 
 
 
 
 
 
 
 
 
 
 
 
 
 
 
CC:                                                             [id number]
DICTATION DATE: 17 1356     :     17 1407      DEP CLI 
                                                                      17
[place] [address]

## 2017-02-24 NOTE — OP
PATIENT NAME:  ELMER SOTOMAYOR                          MEDICAL RECORD: [id number]
:[birth year]                                             LOCATION:D.CAT          
                                                         ADMISSION DATE:        
SURGEON:  RUDDY REIS MD             
 
 
DATE OF OPERATION:  2017
 
Addendum
 
As well, there was PTCA of the LAD diagonal, this was addressed with a 3.0 x 6
mm Euphora balloon.  This is secondary to plaque shift into the diagonal after
PTCA stent of the LAD causing greater than 70% stenosis of diagonal after the
balloon and there was 0% residual stenosis of the diagonal as well.
 
TRANSINT:[id number] Voice Confirmation ID: [id number] DOCUMENT ID: [id number]
                                           
                                           RUDDY REIS MD             
 
 
 
Electronically Signed by RUDDY REIS on 17 at 0846
 
 
 
 
 
 
 
 
 
 
 
 
 
 
 
 
 
 
 
 
 
 
 
 
 
 
 
 
CC:                                                             [id number]
DICTATION DATE: 02/15/17 1009     :     02/15/17 1739      DEP CLI 
                                                                      17
[id number] [place] [address]

## 2019-01-04 ENCOUNTER — HOSPITAL ENCOUNTER (OUTPATIENT)
Dept: HOSPITAL 84 - D.LABREF | Age: 84
Discharge: HOME | End: 2019-01-04
Attending: NURSE PRACTITIONER
Payer: MEDICARE

## 2019-01-04 VITALS — BODY MASS INDEX: 35.7 KG/M2

## 2019-01-04 DIAGNOSIS — I10: Primary | ICD-10-CM

## 2019-01-04 LAB
ANION GAP SERPL CALC-SCNC: 11.5 MMOL/L (ref 8–16)
BUN SERPL-MCNC: 15 MG/DL (ref 7–18)
CALCIUM SERPL-MCNC: 9 MG/DL (ref 8.5–10.1)
CHLORIDE SERPL-SCNC: 100 MMOL/L (ref 98–107)
CO2 SERPL-SCNC: 31.9 MMOL/L (ref 21–32)
CREAT SERPL-MCNC: 0.9 MG/DL (ref 0.6–1.3)
GLUCOSE SERPL-MCNC: 95 MG/DL (ref 74–106)
OSMOLALITY SERPL CALC.SUM OF ELEC: 278 MOSM/KG (ref 275–300)
POTASSIUM SERPL-SCNC: 4.4 MMOL/L (ref 3.5–5.1)
SODIUM SERPL-SCNC: 139 MMOL/L (ref 136–145)

## 2019-02-19 ENCOUNTER — HOSPITAL ENCOUNTER (OUTPATIENT)
Dept: HOSPITAL 84 - D.CATH | Age: 84
Discharge: HOME | End: 2019-02-19
Attending: INTERNAL MEDICINE
Payer: MEDICARE

## 2019-02-19 VITALS — SYSTOLIC BLOOD PRESSURE: 119 MMHG | DIASTOLIC BLOOD PRESSURE: 71 MMHG

## 2019-02-19 VITALS — BODY MASS INDEX: 34.59 KG/M2 | BODY MASS INDEX: 34.59 KG/M2 | WEIGHT: 269.57 LBS | HEIGHT: 74 IN

## 2019-02-19 DIAGNOSIS — I34.0: ICD-10-CM

## 2019-02-19 DIAGNOSIS — I35.0: Primary | ICD-10-CM

## 2019-02-19 DIAGNOSIS — Z01.812: ICD-10-CM

## 2019-02-19 LAB
ANION GAP SERPL CALC-SCNC: 12.5 MMOL/L (ref 8–16)
BUN SERPL-MCNC: 19 MG/DL (ref 7–18)
CALCIUM SERPL-MCNC: 8.6 MG/DL (ref 8.5–10.1)
CHLORIDE SERPL-SCNC: 99 MMOL/L (ref 98–107)
CO2 SERPL-SCNC: 29.2 MMOL/L (ref 21–32)
CREAT SERPL-MCNC: 0.8 MG/DL (ref 0.6–1.3)
GLUCOSE SERPL-MCNC: 98 MG/DL (ref 74–106)
OSMOLALITY SERPL CALC.SUM OF ELEC: 275 MOSM/KG (ref 275–300)
POTASSIUM SERPL-SCNC: 3.7 MMOL/L (ref 3.5–5.1)
SODIUM SERPL-SCNC: 137 MMOL/L (ref 136–145)

## 2019-02-19 NOTE — NUR
PT VOIDED 250 CC CLEAR YELLOW URINE TO URINAL. DENIES ANY C/O. WIFE
AT BEDSIDE, CALL LIGHT IN REACH.

## 2019-02-19 NOTE — NUR
PT HAS AMBULTED TO THE BATHROOM AND VOIDED QS. POST INFUSION WEIGHT
.7 POUNDS/ 119.4 KG. PT DENIES ANY C/O. DISCHARGE INSTRUCTIONS
REVIEWED WITH PT AND WIFE WHO VERBALIZE UNDERSTANDING. PT ESCORTED TO
PRIVATE AUTO VIA WC BY NURSE WITH WIFE DRIVING HIM HOME.

## 2019-02-19 NOTE — NUR
PT HAS BIMAL BREAKFAST WITH NO C/O NAUSEA. SITTING UP IN BED WATCHING
TV WITH WIFE AT BEDSIDE. DENIES ANY C/O. A-FIB WITH PVC'S, HR IS 68,
BP /68.

## 2019-02-19 NOTE — NUR
1530 INFUSION STOPPED, HAS INFUSED X8 HOURS VIA PUMP. WILL CONTINUE
TO MONITOR PT FOR 30 MINUTES POST INFUSION.

## 2019-02-19 NOTE — NUR
1600 PT IS ALERT AND DENIES ANY C/O. A-FIB RATE 70, DENIES ANY C/O
CHEST PAIN. BP /78. IV DC'D WITH CATH INTACT AND PT IS DRESSING
FOR DC TO HOME.

## 2019-02-19 NOTE — NUR
1505 PT VOIDED 500 CC CLEAR YELLOW URINE TO URINAL, IS ALERT AND
DENIES NEEDS A TTHIS TIME. WIFE AT BEDSIDE.

## 2019-02-19 NOTE — NUR
1110 PT HAS VOIDED 400 CC CLEAR YELLOW URINE, DENIES ANY C/O. VSS,
WIFE AT BEDSIDE.
1200 2GM NA [facility] DIST TRAY SERVED. PT SITTING UP IN BED, WIFE AT
BEDSIDE. DENIES NEEDS AT THIS TIME.

## 2019-02-19 NOTE — NUR
1250 PT VOIDED 300 CC CLEAR YELLOW URINE TO URINAL. IS ALERT AND
DENIES ANY C/O. WIFE AT BEDSIDE.
1330 PT HAS FINISHED LUNCH TRAY, DENIES ANY C/O. VOIDED ADDITIONAL
400 CC CLEAR YELLOW URINE TO URINAL, WIFE AT BEDSIDE.

## 2019-02-19 NOTE — NUR
0731 IV HAS BEEN STARTED TO LEFT ARM WITH 22G X1 STICK AND BUMEX
INFUSION HAS STARTED VIA PUMP PER ORDERS. LAB HAS BEEN DRAWN. PT
INSTRUCTED OF FALL RISK STATUS AND INSTRUCTED TO CALL NURSE IF HE
NEEDS TO GET UP OUT OF BED. PT VERBALIZES UNDERSTANDING. URINALS X2
AT BEDSIDE. WIFE AT BEDSIDE. PT IS ALERT AND DENIES ANY C/O. IS IN
ATRIAL FIBRILLATION WITH VARYING HEART RATE 32-90. BREAKFAST TRAY
ORDERED. PT HAS CALL LIGHT IN REACH AND DENIES NEEDS AT THIS TIME.

## 2019-02-26 NOTE — EC
PATIENT:ELMER SOTOMAYOR                DATE OF SERVICE: 02/19/19
SEX: M                                  MEDICAL RECORD: [id number]
DATE OF BIRTH: [birth year]                        LOCATION:D.CAT          
AGE OF PATIENT: 84                             ADMISSION DATE: 02/19/19
 
REFERRING PHYSICIAN:                               
 
INTERPRETING PHYSICIAN: RUDDY MCCLAIN MD             
 
 
 
                             ECHOCARDIOGRAM REPORT
  ECHO CHARGES 4               ECHO COMPLETE                 Date: 02/19/19
 
 
 
CLINICAL DIAGNOSIS: CHF                           
 
                         ECHOCARDIOGRAPHIC MEASUREMENTS
      (adult normal given)
   AC root (d.<3.7cm) 4.6  cm   LV Septum d (<1.2 cm> 2.0  cm
      Valve Excursion 1.8  cm     LV Septum (systole) 2.4  cm
Left Atria (s.<4.0cm> 5.3  cm          LVPW d(<1.2cm) 2.0  cm
        RV (d.<2.3cm) 6.2  cm           LVPW (sytole) 2.5  cm
  LV diastole(<5.6CM) 7.6  cm       MV E-F(>70mm/sec)      cm
           LV systole 5.7  cm           LVOT Diameter 1.8  cm
       MV exc.(>10mm) 2.8  cm
Est.ejection fraction (50-75%)     %
 
   DOPPLER:
     LVIT      cm/sec A 61.0 cm/sec E 153   cm/sec
       LA      cm/sec      RVSP 38   mmHg
     LVOT 95   cm/sec   AOP1/2T      m/s
  Asc. Ao 255  cm/sec
     RVOT 100  cm/sec
       RA      cm/sec
         cm/sec
 AV Gradient Peak 26.05mmHg  AV Mean 14.53mmHg  AV Area 1.2  cm
 MV Gradient Peak 8.62 mmHg  MV Mean 2.21 mmHg  MV Area      cm
   COMMENTS:                                              
 
 
 Cardiac Sonographer: Laurita LOVETT              
      Cardiologist: 1          Dr. Mcclain                
             TAPE# PACS           
                                       Pericardial Effusion N                        
 
 
DATE OF SERVICE:  02/19/2019
 
ECHOCARDIOGRAM
 
FINDINGS:
1.  Left ventricular chamber size is within normal limits.  Left ventricular
systolic function is normal.  Overall ejection fraction estimated at 55%.
2.  Left atrium, right atrium, and right ventricle chamber sizes are
mild-to-moderately dilated.
3.  Valvular structures:  Aortic valve demonstrates mild calcific aortic
 
 
 
ECHOCARDIOGRAM REPORT                          [id number]    ELMER SOTOMAYOR        
 
 
stenosis, valve area calculates 1.2 cm-squared and has a gradient of 26-mm
across the valve.  The remaining valvular structures have normal structure and
motion.
4.  Doppler interrogation elsewise reveals moderate mitral regurgitation, mild
tricuspid regurgitation, no other valvular insufficiency or stenosis.  Pulmonary
systolic pressure is estimated at 38 mmHg.
5.  No evidence of pericardial effusion or left ventricular thrombus.
 
TRANSINT:[id number] Voice Confirmation ID: [id number] DOCUMENT ID: [id number]
                                           
                                           RUDDY MCCLAIN MD             
 
 
 
Electronically Signed by RUDDY MCCLAIN on 02/26/19 at 1118
 
 
 
 
 
 
 
 
 
 
 
 
 
 
 
 
 
 
 
 
 
 
 
 
 
 
 
 
 
 
 
CC:                                                             [id number]
DICTATION DATE: 02/19/19 1158     :     02/19/19 1239      DEP CLI 
                                                                      02/19/19
[address] [address]

## 2020-07-31 ENCOUNTER — HOSPITAL ENCOUNTER (OUTPATIENT)
Dept: HOSPITAL 84 - D.HCCECHO | Age: 85
Discharge: HOME | End: 2020-07-31
Attending: INTERNAL MEDICINE
Payer: MEDICARE

## 2020-07-31 VITALS — BODY MASS INDEX: 34.6 KG/M2

## 2020-07-31 DIAGNOSIS — I25.10: Primary | ICD-10-CM

## 2021-05-10 ENCOUNTER — HOSPITAL ENCOUNTER (EMERGENCY)
Dept: HOSPITAL 84 - D.ER | Age: 86
Discharge: HOME | End: 2021-05-10
Payer: MEDICARE

## 2021-05-10 VITALS — BODY MASS INDEX: 33.18 KG/M2 | WEIGHT: 258.54 LBS | HEIGHT: 74 IN

## 2021-05-10 VITALS — DIASTOLIC BLOOD PRESSURE: 87 MMHG | SYSTOLIC BLOOD PRESSURE: 169 MMHG

## 2021-05-10 DIAGNOSIS — R04.0: Primary | ICD-10-CM

## 2021-05-10 DIAGNOSIS — I10: ICD-10-CM

## 2021-05-10 DIAGNOSIS — K21.9: ICD-10-CM

## 2021-05-10 LAB
ALBUMIN SERPL-MCNC: 3.5 G/DL (ref 3.4–5)
ALP SERPL-CCNC: 77 U/L (ref 30–120)
ALT SERPL-CCNC: 66 U/L (ref 10–68)
ANION GAP SERPL CALC-SCNC: 12.9 MMOL/L (ref 8–16)
APTT BLD: 46.9 SECONDS (ref 22.8–39.4)
BASOPHILS NFR BLD AUTO: 0.4 % (ref 0–2)
BILIRUB SERPL-MCNC: 1.44 MG/DL (ref 0.2–1.3)
BUN SERPL-MCNC: 21 MG/DL (ref 7–18)
CALCIUM SERPL-MCNC: 9.4 MG/DL (ref 8.5–10.1)
CHLORIDE SERPL-SCNC: 94 MMOL/L (ref 98–107)
CO2 SERPL-SCNC: 32.5 MMOL/L (ref 21–32)
CREAT SERPL-MCNC: 1.2 MG/DL (ref 0.6–1.3)
EOSINOPHIL NFR BLD: 1.5 % (ref 0–7)
ERYTHROCYTE [DISTWIDTH] IN BLOOD BY AUTOMATED COUNT: 13.6 % (ref 11.5–14.5)
GLOBULIN SER-MCNC: 4.4 G/L
GLUCOSE SERPL-MCNC: 129 MG/DL (ref 74–106)
HCT VFR BLD CALC: 36.6 % (ref 42–54)
HGB BLD-MCNC: 12.4 G/DL (ref 13.5–17.5)
IMM GRANULOCYTES NFR BLD: 0.1 % (ref 0–5)
INR PPP: 2.84 (ref 0.85–1.17)
LYMPHOCYTES # BLD: 1.44 10X3/UL (ref 1.32–3.57)
LYMPHOCYTES NFR BLD AUTO: 21.3 % (ref 15–50)
MCH RBC QN AUTO: 36.3 PG (ref 26–34)
MCHC RBC AUTO-ENTMCNC: 33.9 G/DL (ref 31–37)
MCV RBC: 107 FL (ref 80–100)
MONOCYTES NFR BLD: 8 % (ref 2–11)
NEUTROPHILS # BLD: 4.64 10X3/UL (ref 1.78–5.38)
NEUTROPHILS NFR BLD AUTO: 68.7 % (ref 40–80)
OSMOLALITY SERPL CALC.SUM OF ELEC: 276 MOSM/KG (ref 275–300)
PLATELET # BLD: 144 10X3/UL (ref 130–400)
PMV BLD AUTO: 9.7 FL (ref 7.4–10.4)
POTASSIUM SERPL-SCNC: 3.4 MMOL/L (ref 3.5–5.1)
PROT SERPL-MCNC: 7.9 G/DL (ref 6.4–8.2)
PROTHROMBIN TIME: 27.7 SECONDS (ref 11.6–15)
RBC # BLD AUTO: 3.42 10X6/UL (ref 4.2–6.1)
SODIUM SERPL-SCNC: 136 MMOL/L (ref 136–145)
WBC # BLD AUTO: 6.8 10X3/UL (ref 4.8–10.8)